# Patient Record
Sex: MALE | Race: WHITE | Employment: OTHER | URBAN - NONMETROPOLITAN AREA
[De-identification: names, ages, dates, MRNs, and addresses within clinical notes are randomized per-mention and may not be internally consistent; named-entity substitution may affect disease eponyms.]

---

## 2022-01-13 ENCOUNTER — HOSPITAL ENCOUNTER (OUTPATIENT)
Age: 32
Discharge: HOME | End: 2022-01-13

## 2022-01-13 LAB
INFLUENZA A: NOT DETECTED
INFLUENZA B: NOT DETECTED
SARS-COV-2 RNA, RT PCR: NOT DETECTED

## 2022-01-13 PROCEDURE — 87636 SARSCOV2 & INF A&B AMP PRB: CPT

## 2024-10-14 SDOH — HEALTH STABILITY: PHYSICAL HEALTH: ON AVERAGE, HOW MANY DAYS PER WEEK DO YOU ENGAGE IN MODERATE TO STRENUOUS EXERCISE (LIKE A BRISK WALK)?: 5 DAYS

## 2024-10-14 SDOH — HEALTH STABILITY: PHYSICAL HEALTH: ON AVERAGE, HOW MANY MINUTES DO YOU ENGAGE IN EXERCISE AT THIS LEVEL?: 100 MIN

## 2024-10-15 ENCOUNTER — HOSPITAL ENCOUNTER (OUTPATIENT)
Dept: GENERAL RADIOLOGY | Age: 34
Discharge: HOME | End: 2024-10-15

## 2024-10-15 ENCOUNTER — HOSPITAL ENCOUNTER (OUTPATIENT)
Age: 34
Discharge: HOME | End: 2024-10-15

## 2024-10-15 ENCOUNTER — OFFICE VISIT (OUTPATIENT)
Dept: FAMILY MEDICINE CLINIC | Age: 34
End: 2024-10-15

## 2024-10-15 VITALS
DIASTOLIC BLOOD PRESSURE: 84 MMHG | HEIGHT: 70 IN | RESPIRATION RATE: 12 BRPM | WEIGHT: 166.2 LBS | SYSTOLIC BLOOD PRESSURE: 122 MMHG | OXYGEN SATURATION: 98 % | BODY MASS INDEX: 23.79 KG/M2 | TEMPERATURE: 97.3 F | HEART RATE: 64 BPM

## 2024-10-15 DIAGNOSIS — S69.92XA INJURY OF LEFT WRIST, INITIAL ENCOUNTER: ICD-10-CM

## 2024-10-15 DIAGNOSIS — Z76.89 ENCOUNTER TO ESTABLISH CARE: Primary | ICD-10-CM

## 2024-10-15 DIAGNOSIS — M25.532 ACUTE PAIN OF LEFT WRIST: ICD-10-CM

## 2024-10-15 PROCEDURE — 99204 OFFICE O/P NEW MOD 45 MIN: CPT | Performed by: NURSE PRACTITIONER

## 2024-10-15 PROCEDURE — 73110 X-RAY EXAM OF WRIST: CPT

## 2024-10-15 SDOH — ECONOMIC STABILITY: FOOD INSECURITY: WITHIN THE PAST 12 MONTHS, YOU WORRIED THAT YOUR FOOD WOULD RUN OUT BEFORE YOU GOT MONEY TO BUY MORE.: NEVER TRUE

## 2024-10-15 SDOH — ECONOMIC STABILITY: FOOD INSECURITY: WITHIN THE PAST 12 MONTHS, THE FOOD YOU BOUGHT JUST DIDN'T LAST AND YOU DIDN'T HAVE MONEY TO GET MORE.: NEVER TRUE

## 2024-10-15 SDOH — ECONOMIC STABILITY: INCOME INSECURITY: HOW HARD IS IT FOR YOU TO PAY FOR THE VERY BASICS LIKE FOOD, HOUSING, MEDICAL CARE, AND HEATING?: NOT HARD AT ALL

## 2024-10-15 ASSESSMENT — PATIENT HEALTH QUESTIONNAIRE - PHQ9
SUM OF ALL RESPONSES TO PHQ QUESTIONS 1-9: 0
SUM OF ALL RESPONSES TO PHQ QUESTIONS 1-9: 0
2. FEELING DOWN, DEPRESSED OR HOPELESS: NOT AT ALL
1. LITTLE INTEREST OR PLEASURE IN DOING THINGS: NOT AT ALL
SUM OF ALL RESPONSES TO PHQ9 QUESTIONS 1 & 2: 0
SUM OF ALL RESPONSES TO PHQ QUESTIONS 1-9: 0
SUM OF ALL RESPONSES TO PHQ QUESTIONS 1-9: 0

## 2024-10-15 NOTE — PROGRESS NOTES
[facility]  [address] [place] [address]  Chief Complaint   Patient presents with    New Patient     States happened Saturday was wrestling with kids and hurt left wrist. States pain is described as a dull constant pain but when moved it certain in spots pain goes to a sharp. Wearing wrist brace and icing. Also noticing swelling, states this has improved       History obtained from chart review and the patient.    SUBJECTIVE:  Jassi Panchal is a 34 y.o. male that presents today for establishing care with new physician, etc. New patient, 1st time visit to [facility] @ [facility].    Wrist Pain  Injured his left wrist while wrestling 4 days ago  Caught two of his kids and hyperextended wrist back  Has had pain and swelling  Using wrist brace which does help  Pain is dull, 1/10, but worse if its accidentally hit or ruel  Worse with lateral movements and flexion/extension    Age/Gender Health Maintenance    Lipid - 40  DM Screen - 40  Colon Cancer Screening - 45  Lung Cancer Screening (Age 55 to 80 with 30 pack year hx, current smoker or quit within past 15 years) - n/a    Tetanus - every 10 years  Influenza Vaccine - yearly  Pneumonia Vaccine - 65  Zostavax - 50   HPV Vaccine - n/a    PSA testing discussion - 55  AAA Screening - 67    No current outpatient medications on file.     No current facility-administered medications for this visit.     No orders of the defined types were placed in this encounter.        All medications reviewed and reconciled, including OTC and herbal medications. Updated list given to patient.       There is no problem list on file for this patient.      History reviewed. No pertinent past medical history.    History reviewed. No pertinent surgical history.    No Known Allergies    Social History     Socioeconomic History    Marital status:      Spouse name: Not on file    Number of children: Not on file    Years of education: Not on file    Highest

## 2024-10-15 NOTE — PROGRESS NOTES
Jassi Panchal (:  1990) is a 34 y.o. male,Established patient, here for evaluation of the following chief complaint(s):  New Patient (States happened Saturday was wrestling with kids and hurt left wrist. States pain is described as a dull constant pain but when moved it certain in spots pain goes to a sharp. Wearing wrist brace and icing. Also noticing swelling, states this has improved)         Subjective   HPI  Wrist pain  Started Saturday Traumatic injury. Caught two of his kids which bent his wrist backwards. Icing and wrist brace. Some Swelling   Denies numbness and tingling    Review of Systems   Musculoskeletal:  Positive for joint swelling.          Objective   Physical Exam  Musculoskeletal:      Left wrist: Swelling, tenderness (ulnar) and bony tenderness present. No lacerations. Decreased range of motion.       ASSESSMENT & PLAN  Jassi \"Eric\" was seen today for new patient.    Diagnoses and all orders for this visit:    Encounter to establish care    Injury of left wrist, initial encounter  -     XR WRIST LEFT (MIN 3 VIEWS); Future    Acute pain of left wrist  -     XR WRIST LEFT (MIN 3 VIEWS); Future        Return if symptoms worsen or fail to improve.             An electronic signature was used to authenticate this note.    --Delfino Thibodeaux

## 2024-10-16 ENCOUNTER — TELEPHONE (OUTPATIENT)
Dept: FAMILY MEDICINE CLINIC | Age: 34
End: 2024-10-16

## 2024-10-16 NOTE — TELEPHONE ENCOUNTER
Left detailed message regarding results. Okay per HIPAA. Requested call back at [phone]  if they have any further questions.

## 2024-10-16 NOTE — TELEPHONE ENCOUNTER
----- Message from NESS Shipman CNP sent at 10/16/2024  9:57 AM EDT -----  Let Eric know his wrist xray does show some soft tissue swelling, but no fracture.